# Patient Record
Sex: FEMALE | URBAN - METROPOLITAN AREA
[De-identification: names, ages, dates, MRNs, and addresses within clinical notes are randomized per-mention and may not be internally consistent; named-entity substitution may affect disease eponyms.]

---

## 2024-05-10 ENCOUNTER — EDUCATION (OUTPATIENT)
Dept: HEMATOLOGY/ONCOLOGY | Facility: CLINIC | Age: 54
End: 2024-05-10

## 2024-05-10 DIAGNOSIS — Z52.001 STEM CELL DONOR: Primary | ICD-10-CM

## 2024-05-10 NOTE — PROGRESS NOTES
"Spoke with Zayda Jhon, potential donor for RECIPIENT name today via telephone. Discussed the fact that her brother(s) is in need of a stem cell transplant. Zayda said that she was interested in possibly being the donor for herbrother(s)'s transplant. Briefly discussed the donation process. Explained to Zayda that if she would match her brother(s), that her commitment to donate is very important and that she has the right to change his mind. Also explained however, that a late decision not to donate can be life threatening to the patient. Explained that she should think seriously about her commitment before she has her tissue typing drawn, and if she feels uncomfortable moving forward, that it is okay for her to say "no." Also informed Zayda that if she is a match for her brother(s), that we will contact her and ask if she is willing to donate. If she agrees to proceed, we will ask her about her health and schedule a physician appointment and more testing. She will receive a physical examination to be sure it is safe for her to donate and that her donation will provide the best possible outcome for the patient. We will follow medical guidelines that protect her health as a potential donor as well as the health of the transplant patient. Zayda was given the opportunity to ask questions.    Will mail HLA typing kit  "

## 2024-10-02 ENCOUNTER — DOCUMENTATION ONLY (OUTPATIENT)
Dept: HEMATOLOGY/ONCOLOGY | Facility: CLINIC | Age: 54
End: 2024-10-02
Payer: MEDICAID

## 2024-10-07 PROCEDURE — 81373 HLA I TYPING 1 LOCUS LR: CPT | Mod: 59 | Performed by: INTERNAL MEDICINE

## 2024-10-07 PROCEDURE — 81376 HLA II TYPING 1 LOCUS LR: CPT | Mod: 59 | Performed by: INTERNAL MEDICINE

## 2024-10-07 PROCEDURE — 81373 HLA I TYPING 1 LOCUS LR: CPT | Performed by: INTERNAL MEDICINE

## 2024-10-07 PROCEDURE — 81376 HLA II TYPING 1 LOCUS LR: CPT | Performed by: INTERNAL MEDICINE

## 2024-10-09 ENCOUNTER — LAB VISIT (OUTPATIENT)
Dept: LAB | Facility: HOSPITAL | Age: 54
End: 2024-10-09
Payer: MEDICAID

## 2024-10-09 DIAGNOSIS — Z52.001 STEM CELL DONOR: ICD-10-CM

## 2024-10-09 PROCEDURE — 36415 COLL VENOUS BLD VENIPUNCTURE: CPT | Performed by: INTERNAL MEDICINE

## 2024-10-11 LAB — ABO + RH BLD: NORMAL

## 2024-10-12 LAB — HLATY INTERPRETATION: NORMAL
